# Patient Record
Sex: FEMALE | Race: WHITE | NOT HISPANIC OR LATINO | URBAN - METROPOLITAN AREA
[De-identification: names, ages, dates, MRNs, and addresses within clinical notes are randomized per-mention and may not be internally consistent; named-entity substitution may affect disease eponyms.]

---

## 2021-04-29 ENCOUNTER — OFFICE VISIT (OUTPATIENT)
Dept: URBAN - METROPOLITAN AREA TELEHEALTH 2 | Facility: TELEHEALTH | Age: 41
End: 2021-04-29

## 2021-06-28 ENCOUNTER — OFFICE VISIT (OUTPATIENT)
Dept: URBAN - METROPOLITAN AREA SURGERY CENTER 4 | Facility: SURGERY CENTER | Age: 41
End: 2021-06-28

## 2021-06-30 ENCOUNTER — OFFICE VISIT (OUTPATIENT)
Dept: URBAN - METROPOLITAN AREA SURGERY CENTER 4 | Facility: SURGERY CENTER | Age: 41
End: 2021-06-30

## 2021-07-15 ENCOUNTER — OFFICE VISIT (OUTPATIENT)
Dept: URBAN - METROPOLITAN AREA TELEHEALTH 2 | Facility: TELEHEALTH | Age: 41
End: 2021-07-15

## 2021-07-28 ENCOUNTER — OFFICE VISIT (OUTPATIENT)
Dept: URBAN - METROPOLITAN AREA SURGERY CENTER 4 | Facility: SURGERY CENTER | Age: 41
End: 2021-07-28

## 2021-07-30 LAB — PATHOLOGY (INDENTED REPORT): (no result)

## 2021-08-12 ENCOUNTER — OFFICE VISIT (OUTPATIENT)
Dept: URBAN - METROPOLITAN AREA TELEHEALTH 2 | Facility: TELEHEALTH | Age: 41
End: 2021-08-12

## 2022-07-09 ENCOUNTER — TELEPHONE ENCOUNTER (OUTPATIENT)
Dept: URBAN - METROPOLITAN AREA CLINIC 121 | Facility: CLINIC | Age: 42
End: 2022-07-09

## 2022-07-10 ENCOUNTER — TELEPHONE ENCOUNTER (OUTPATIENT)
Dept: URBAN - METROPOLITAN AREA CLINIC 121 | Facility: CLINIC | Age: 42
End: 2022-07-10

## 2022-07-10 RX ORDER — LEVONORGESTREL AND ETHINYL ESTRADIOL
KIT ONCE A DAY
Refills: 0 | Status: ACTIVE | COMMUNITY
Start: 2021-06-04

## 2022-07-10 RX ORDER — L. ACIDOPHILUS/BIFID. ANIMALIS 10B CELL
SEVERAL TIMES A WEEK CAPSULE ORAL TAKE AS DIRECTED
Refills: 0 | Status: ACTIVE | COMMUNITY
Start: 2021-04-29

## 2024-09-10 ENCOUNTER — LAB OUTSIDE AN ENCOUNTER (OUTPATIENT)
Dept: URBAN - METROPOLITAN AREA CLINIC 63 | Facility: CLINIC | Age: 44
End: 2024-09-10

## 2024-09-10 ENCOUNTER — OFFICE VISIT (OUTPATIENT)
Dept: URBAN - METROPOLITAN AREA CLINIC 63 | Facility: CLINIC | Age: 44
End: 2024-09-10
Payer: COMMERCIAL

## 2024-09-10 ENCOUNTER — DASHBOARD ENCOUNTERS (OUTPATIENT)
Age: 44
End: 2024-09-10

## 2024-09-10 VITALS
DIASTOLIC BLOOD PRESSURE: 82 MMHG | WEIGHT: 161.4 LBS | BODY MASS INDEX: 26.89 KG/M2 | OXYGEN SATURATION: 99 % | HEART RATE: 92 BPM | HEIGHT: 65 IN | TEMPERATURE: 98.6 F | SYSTOLIC BLOOD PRESSURE: 128 MMHG

## 2024-09-10 DIAGNOSIS — R12 HEARTBURN: ICD-10-CM

## 2024-09-10 DIAGNOSIS — Z80.0 FAMILY HISTORY OF COLON CANCER: ICD-10-CM

## 2024-09-10 DIAGNOSIS — Z86.010 PERSONAL HISTORY OF COLONIC POLYPS: ICD-10-CM

## 2024-09-10 PROBLEM — 428283002: Status: ACTIVE | Noted: 2024-09-10

## 2024-09-10 PROCEDURE — 99204 OFFICE O/P NEW MOD 45 MIN: CPT | Performed by: PHYSICIAN ASSISTANT

## 2024-09-10 RX ORDER — LEVONORGESTREL AND ETHINYL ESTRADIOL 0.1-0.02MG
KIT ORAL
Qty: 84 TABLET | Refills: 0 | Status: ACTIVE | COMMUNITY

## 2024-09-10 RX ORDER — L. ACIDOPHILUS/BIFID. ANIMALIS 10B CELL
SEVERAL TIMES A WEEK CAPSULE ORAL TAKE AS DIRECTED
Refills: 0 | Status: ACTIVE | COMMUNITY
Start: 2021-04-29

## 2024-09-10 RX ORDER — ALPRAZOLAM 0.25 MG/1
TABLET ORAL
Qty: 30 TABLET | Refills: 0 | Status: ACTIVE | COMMUNITY

## 2024-09-10 RX ORDER — ROSUVASTATIN 5 MG/1
TAKE 1 TABLET BY MOUTH EVERY DAY TABLET, FILM COATED ORAL
Qty: 90 EACH | Refills: 1 | Status: ACTIVE | COMMUNITY

## 2024-09-10 RX ORDER — PANTOPRAZOLE SODIUM 40 MG/1
1 TABLET TABLET, DELAYED RELEASE ORAL ONCE A DAY
Qty: 90 TABLET | Refills: 0

## 2024-09-10 RX ORDER — PANTOPRAZOLE SODIUM 40 MG/1
TAKE 1 TABLET BY MOUTH EVERY DAY IN THE MORNING TABLET, DELAYED RELEASE ORAL
Qty: 90 EACH | Refills: 0 | Status: ACTIVE | COMMUNITY

## 2024-09-10 NOTE — HPI-TODAY'S VISIT:
44-year-old female with hyperlipidemia presents to the office to schedule EGD and high risk surveillance colonoscopy.  She states she has had frequent acid reflux over about 2 years. States she has been using pantoprazole 40mg on a demand basis which is sometimes helpful. She was also told by her dentist that she had acid erosion on her teeth. Denies any odynophagia, dysphagia, nausea, vomiting, abdominal pain, diarrhea, melena, hematochezia. States she has a history of hemorrhoids which will occasionally flare up.  She has no pyrosis, dysphagia, odynophagia, nausea, vomiting, abdominal pain, constipation, diarrhea, rectal bleeding.  Colonoscopy July 28, 2021 for high risk screening:A 10 mm tubular adenoma was removed from the proximal ascending colon.  Small internal hemorrhoids were observed.  Surveillance colonoscopy was recommended in 3 years.  She has a family history of colon cancer in her mother at age 51

## 2024-11-20 ENCOUNTER — OFFICE VISIT (OUTPATIENT)
Dept: URBAN - METROPOLITAN AREA SURGERY CENTER 4 | Facility: SURGERY CENTER | Age: 44
End: 2024-11-20
Payer: COMMERCIAL

## 2024-11-20 ENCOUNTER — CLAIMS CREATED FROM THE CLAIM WINDOW (OUTPATIENT)
Dept: URBAN - METROPOLITAN AREA CLINIC 4 | Facility: CLINIC | Age: 44
End: 2024-11-20
Payer: COMMERCIAL

## 2024-11-20 DIAGNOSIS — K29.70 GASTRITIS, UNSPECIFIED, WITHOUT BLEEDING: ICD-10-CM

## 2024-11-20 DIAGNOSIS — K29.70 GASTRITIS WITHOUT BLEEDING, UNSPECIFIED CHRONICITY, UNSPECIFIED GASTRITIS TYPE: ICD-10-CM

## 2024-11-20 DIAGNOSIS — K29.70 ERYTHEMATOUS GASTROPATHY: ICD-10-CM

## 2024-11-20 DIAGNOSIS — D12.0 BENIGN NEOPLASM OF CECUM: ICD-10-CM

## 2024-11-20 DIAGNOSIS — Z86.0100 PERSONAL HISTORY OF COLONIC POLYPS: ICD-10-CM

## 2024-11-20 DIAGNOSIS — Z80.0 FAMILY HISTORY OF MALIGNANT NEOPLASM OF DIGESTIVE ORGANS: ICD-10-CM

## 2024-11-20 DIAGNOSIS — K63.5 POLYP OF ASCENDING COLON, UNSPECIFIED TYPE: ICD-10-CM

## 2024-11-20 DIAGNOSIS — Z86.0100 PERSONAL HISTORY OF COLON POLYPS, UNSPECIFIED: ICD-10-CM

## 2024-11-20 DIAGNOSIS — K31.89 OTHER DISEASES OF STOMACH AND DUODENUM: ICD-10-CM

## 2024-11-20 DIAGNOSIS — K64.1 SECOND DEGREE HEMORRHOIDS: ICD-10-CM

## 2024-11-20 DIAGNOSIS — Z12.11 COLON CANCER SCREENING: ICD-10-CM

## 2024-11-20 PROCEDURE — 43239 EGD BIOPSY SINGLE/MULTIPLE: CPT | Performed by: INTERNAL MEDICINE

## 2024-11-20 PROCEDURE — 45385 COLONOSCOPY W/LESION REMOVAL: CPT | Performed by: INTERNAL MEDICINE

## 2024-11-20 PROCEDURE — 88305 TISSUE EXAM BY PATHOLOGIST: CPT | Performed by: STUDENT IN AN ORGANIZED HEALTH CARE EDUCATION/TRAINING PROGRAM

## 2024-11-20 PROCEDURE — 00813 ANES UPR LWR GI NDSC PX: CPT | Performed by: NURSE ANESTHETIST, CERTIFIED REGISTERED

## 2024-11-20 RX ORDER — LEVONORGESTREL AND ETHINYL ESTRADIOL 0.1-0.02MG
KIT ORAL
Qty: 84 TABLET | Refills: 0 | Status: ACTIVE | COMMUNITY

## 2024-11-20 RX ORDER — L. ACIDOPHILUS/BIFID. ANIMALIS 10B CELL
SEVERAL TIMES A WEEK CAPSULE ORAL TAKE AS DIRECTED
Refills: 0 | Status: ACTIVE | COMMUNITY
Start: 2021-04-29

## 2024-11-20 RX ORDER — PANTOPRAZOLE SODIUM 40 MG/1
1 TABLET TABLET, DELAYED RELEASE ORAL ONCE A DAY
Qty: 90 TABLET | Refills: 0 | Status: ACTIVE | COMMUNITY

## 2024-11-20 RX ORDER — ROSUVASTATIN 5 MG/1
TAKE 1 TABLET BY MOUTH EVERY DAY TABLET, FILM COATED ORAL
Qty: 90 EACH | Refills: 1 | Status: ACTIVE | COMMUNITY

## 2024-11-20 RX ORDER — ALPRAZOLAM 0.25 MG/1
TABLET ORAL
Qty: 30 TABLET | Refills: 0 | Status: ACTIVE | COMMUNITY

## 2024-12-03 ENCOUNTER — OFFICE VISIT (OUTPATIENT)
Dept: URBAN - METROPOLITAN AREA CLINIC 63 | Facility: CLINIC | Age: 44
End: 2024-12-03
Payer: COMMERCIAL

## 2024-12-03 VITALS
WEIGHT: 164.2 LBS | OXYGEN SATURATION: 96 % | BODY MASS INDEX: 27.36 KG/M2 | HEART RATE: 71 BPM | SYSTOLIC BLOOD PRESSURE: 149 MMHG | DIASTOLIC BLOOD PRESSURE: 87 MMHG | HEIGHT: 65 IN | TEMPERATURE: 97.2 F

## 2024-12-03 DIAGNOSIS — R12 HEARTBURN: ICD-10-CM

## 2024-12-03 DIAGNOSIS — Z86.0101 PERSONAL HISTORY OF ADENOMATOUS AND SERRATED COLON POLYPS: ICD-10-CM

## 2024-12-03 DIAGNOSIS — Z80.0 FAMILY HISTORY OF COLON CANCER: ICD-10-CM

## 2024-12-03 PROCEDURE — 99214 OFFICE O/P EST MOD 30 MIN: CPT | Performed by: PHYSICIAN ASSISTANT

## 2024-12-03 RX ORDER — L. ACIDOPHILUS/BIFID. ANIMALIS 10B CELL
SEVERAL TIMES A WEEK CAPSULE ORAL TAKE AS DIRECTED
Refills: 0 | Status: ACTIVE | COMMUNITY
Start: 2021-04-29

## 2024-12-03 RX ORDER — ALPRAZOLAM 0.25 MG/1
TABLET ORAL
Qty: 30 TABLET | Refills: 0 | Status: ACTIVE | COMMUNITY

## 2024-12-03 RX ORDER — PANTOPRAZOLE SODIUM 40 MG/1
1 TABLET TABLET, DELAYED RELEASE ORAL ONCE A DAY
Qty: 90 TABLET | Refills: 0 | Status: ACTIVE | COMMUNITY

## 2024-12-03 RX ORDER — ROSUVASTATIN 5 MG/1
TAKE 1 TABLET BY MOUTH EVERY DAY TABLET, FILM COATED ORAL
Qty: 90 EACH | Refills: 1 | Status: ACTIVE | COMMUNITY

## 2024-12-03 RX ORDER — LEVONORGESTREL AND ETHINYL ESTRADIOL 0.1-0.02MG
KIT ORAL
Qty: 84 TABLET | Refills: 0 | Status: ACTIVE | COMMUNITY

## 2024-12-03 NOTE — HPI-TODAY'S VISIT:
44-year-old female with hyperlipidemia, GERD, colon polyps and family history of colon cancer in her mother presents for follow-up after EGD and surveillance colonoscopy. She presented to the office in September 2020 for reporting frequent GERD symptoms over 2 years.  She was using pantoprazole 40 mg on a demand basis which was sometimes helpful.  She had been told by her dentist that she had acid erosion of her teeth.  She reported a history of hemorrhoids which would occasionally flareup.  She had no other complaints.  She was advised to start pantoprazole 40 mg once a day.  EGD 11/20/2024:Normal esophagus.  Distal esophageal biopsy showed no significant abnormality.  Minimal inflammation was found in the gastric antrum.  Gastric biopsy was negative for H. pylori.  Normal duodenum.  Duodenal biopsy showed no significant abnormality.  Colonoscopy 11/20/2024:A 16 mm sessile serrated adenoma was removed from the cecum.  2 clips were placed at the site to prevent bleeding.  Her colonoscopy was otherwise normal with the exception of grade 2 internal hemorrhoids.  Surveillance colonoscopy was recommended in 3 years.  She follows up doing well.  She had no problems following her procedure.  She has been using pantoprazole on a demand basis. She has occasional heartburn and is also using OTC antacids on a demand basis. She would like to be referred to a genetic counselor for genetic testing.  She has no GI complaints.  She has a family history of colon cancer in her mother at age 51.

## 2024-12-04 NOTE — PHYSICAL EXAM SKIN:
December 4, 2024     Patient: Dileep Red   YOB: 2010       To Whom it May Concern:    Dileep Red has received treatment at this office on the following dates: 12/4/24 . Strep testing was negative.  No significant rash was noted.  He is fever free.  He may resume school on 12/5/24  without restriction.    If you have any questions or concerns, please don't hesitate to call.      Sincerely,           Ofelia Gomez MD      Medical information is confidential and cannot be disclosed without the written consent of the patient or his representative.    CC: No Recipients     no rashes , no suspicious lesions , no areas of discoloration , no jaundice present , no masses , no tenderness on palpation Previously Declined (within the last year)